# Patient Record
Sex: MALE | Race: OTHER | HISPANIC OR LATINO | URBAN - METROPOLITAN AREA
[De-identification: names, ages, dates, MRNs, and addresses within clinical notes are randomized per-mention and may not be internally consistent; named-entity substitution may affect disease eponyms.]

---

## 2023-04-21 ENCOUNTER — EMERGENCY (EMERGENCY)
Facility: HOSPITAL | Age: 43
LOS: 1 days | Discharge: HOME | End: 2023-04-21
Admitting: EMERGENCY MEDICINE
Payer: COMMERCIAL

## 2023-04-21 VITALS
WEIGHT: 160.06 LBS | DIASTOLIC BLOOD PRESSURE: 89 MMHG | SYSTOLIC BLOOD PRESSURE: 141 MMHG | TEMPERATURE: 98 F | HEART RATE: 91 BPM | OXYGEN SATURATION: 97 % | RESPIRATION RATE: 16 BRPM | HEIGHT: 65 IN

## 2023-04-21 VITALS
DIASTOLIC BLOOD PRESSURE: 84 MMHG | SYSTOLIC BLOOD PRESSURE: 127 MMHG | OXYGEN SATURATION: 100 % | HEART RATE: 74 BPM | RESPIRATION RATE: 16 BRPM | TEMPERATURE: 98 F

## 2023-04-21 DIAGNOSIS — M54.41 LUMBAGO WITH SCIATICA, RIGHT SIDE: ICD-10-CM

## 2023-04-21 DIAGNOSIS — M54.42 LUMBAGO WITH SCIATICA, LEFT SIDE: ICD-10-CM

## 2023-04-21 DIAGNOSIS — M54.50 LOW BACK PAIN, UNSPECIFIED: ICD-10-CM

## 2023-04-21 PROCEDURE — 99284 EMERGENCY DEPT VISIT MOD MDM: CPT | Mod: 25

## 2023-04-21 PROCEDURE — 99284 EMERGENCY DEPT VISIT MOD MDM: CPT

## 2023-04-21 PROCEDURE — 96372 THER/PROPH/DIAG INJ SC/IM: CPT

## 2023-04-21 RX ORDER — METHOCARBAMOL 500 MG/1
750 TABLET, FILM COATED ORAL ONCE
Refills: 0 | Status: COMPLETED | OUTPATIENT
Start: 2023-04-21 | End: 2023-04-21

## 2023-04-21 RX ORDER — KETOROLAC TROMETHAMINE 30 MG/ML
30 SYRINGE (ML) INJECTION ONCE
Refills: 0 | Status: DISCONTINUED | OUTPATIENT
Start: 2023-04-21 | End: 2023-04-21

## 2023-04-21 RX ORDER — LIDOCAINE 4 G/100G
1 CREAM TOPICAL ONCE
Refills: 0 | Status: COMPLETED | OUTPATIENT
Start: 2023-04-21 | End: 2023-04-21

## 2023-04-21 RX ORDER — OXYCODONE AND ACETAMINOPHEN 5; 325 MG/1; MG/1
1 TABLET ORAL ONCE
Refills: 0 | Status: DISCONTINUED | OUTPATIENT
Start: 2023-04-21 | End: 2023-04-21

## 2023-04-21 RX ORDER — IBUPROFEN 200 MG
1 TABLET ORAL
Qty: 30 | Refills: 0
Start: 2023-04-21

## 2023-04-21 RX ORDER — OXYCODONE AND ACETAMINOPHEN 5; 325 MG/1; MG/1
1 TABLET ORAL
Qty: 14 | Refills: 0
Start: 2023-04-21

## 2023-04-21 RX ORDER — METHOCARBAMOL 500 MG/1
1 TABLET, FILM COATED ORAL
Qty: 14 | Refills: 0
Start: 2023-04-21

## 2023-04-21 RX ADMIN — OXYCODONE AND ACETAMINOPHEN 1 TABLET(S): 5; 325 TABLET ORAL at 06:11

## 2023-04-21 RX ADMIN — Medication 30 MILLIGRAM(S): at 05:40

## 2023-04-21 RX ADMIN — LIDOCAINE 1 PATCH: 4 CREAM TOPICAL at 05:40

## 2023-04-21 RX ADMIN — METHOCARBAMOL 750 MILLIGRAM(S): 500 TABLET, FILM COATED ORAL at 05:57
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
## 2023-04-21 NOTE — ED PROVIDER NOTE - MUSCULOSKELETAL, MLM
Spine appears normal, no localized tenderness to sinal vertebrae, no step off sign, no rash, b/l paraspinal muscle tenderness in lumbar region

## 2023-04-21 NOTE — ED PROVIDER NOTE - NSFOLLOWUPINSTRUCTIONS_ED_ALL_ED_FT
Please take medications as prescribed and follow up with your PMD and or follow up with an ortho/spine doctor.        Sciatica    Sciatica is pain, numbness, weakness, or tingling along the path of the sciatic nerve. The sciatic nerve starts in the lower back and runs down the back of each leg. The nerve controls the muscles in the lower leg and in the back of the knee. It also provides feeling (sensation) to the back of the thigh, the lower leg, and the sole of the foot. Sciatica is a symptom of another medical condition that pinches or puts pressure on the sciatic nerve.    Sciatica most often only affects one side of the body. Sciatica usually goes away on its own or with treatment. In some cases, sciatica may come back (recur).    What are the causes?  This condition is caused by pressure on the sciatic nerve or pinching of the nerve. This may be the result of:  A disk in between the bones of the spine bulging out too far (herniated disk).  Age-related changes in the spinal disks.  A pain disorder that affects a muscle in the buttock.  Extra bone growth near the sciatic nerve.  A break (fracture) of the pelvis.  Pregnancy.  Tumor. This is rare.  What increases the risk?  The following factors may make you more likely to develop this condition:  Playing sports that place pressure or stress on the spine.  Having poor strength and flexibility.  A history of back injury or surgery.  Sitting for long periods of time.  Doing activities that involve repetitive bending or lifting.  Obesity.  What are the signs or symptoms?  Symptoms can vary from mild to very severe, and they may include:  Any of these problems in the lower back, leg, hip, or buttock:  Mild tingling, numbness, or dull aches.  Burning sensations.  Sharp pains.  Numbness in the back of the calf or the sole of the foot.  Leg weakness.  Severe back pain that makes movement difficult.  Symptoms may get worse when you cough, sneeze, or laugh, or when you sit or stand for long periods of time.    How is this diagnosed?  This condition may be diagnosed based on:  Your symptoms and medical history.  A physical exam.  Blood tests.  Imaging tests, such as:  X-rays.  MRI.  CT scan.  How is this treated?  In many cases, this condition improves on its own without treatment. However, treatment may include:  Reducing or modifying physical activity.  Exercising and stretching.  Icing and applying heat to the affected area.  Medicines that help to:  Relieve pain and swelling.  Relax your muscles.  Injections of medicines that help to relieve pain, irritation, and inflammation around the sciatic nerve (steroids).  Surgery.  Follow these instructions at home:  Medicines    Take over-the-counter and prescription medicines only as told by your health care provider.  Ask your health care provider if the medicine prescribed to you:  Requires you to avoid driving or using heavy machinery.  Can cause constipation. You may need to take these actions to prevent or treat constipation:  Drink enough fluid to keep your urine pale yellow.  Take over-the-counter or prescription medicines.  Eat foods that are high in fiber, such as beans, whole grains, and fresh fruits and vegetables.  Limit foods that are high in fat and processed sugars, such as fried or sweet foods.  Managing pain        If directed, put ice on the affected area.  Put ice in a plastic bag.  Place a towel between your skin and the bag.  Leave the ice on for 20 minutes, 2–3 times a day.  If directed, apply heat to the affected area. Use the heat source that your health care provider recommends, such as a moist heat pack or a heating pad.  Place a towel between your skin and the heat source.  Leave the heat on for 20–30 minutes.  Remove the heat if your skin turns bright red. This is especially important if you are unable to feel pain, heat, or cold. You may have a greater risk of getting burned.  Activity      Return to your normal activities as told by your health care provider. Ask your health care provider what activities are safe for you.  Avoid activities that make your symptoms worse.  Take brief periods of rest throughout the day.  When you rest for longer periods, mix in some mild activity or stretching between periods of rest. This will help to prevent stiffness and pain.  Avoid sitting for long periods of time without moving. Get up and move around at least one time each hour.  Exercise and stretch regularly, as told by your health care provider.  Do not lift anything that is heavier than 10 lb (4.5 kg) while you have symptoms of sciatica. When you do not have symptoms, you should still avoid heavy lifting, especially repetitive heavy lifting.  When you lift objects, always use proper lifting technique, which includes:  Bending your knees.  Keeping the load close to your body.  Avoiding twisting.  General instructions    Maintain a healthy weight. Excess weight puts extra stress on your back.  Wear supportive, comfortable shoes. Avoid wearing high heels.  Avoid sleeping on a mattress that is too soft or too hard. A mattress that is firm enough to support your back when you sleep may help to reduce your pain.  Keep all follow-up visits as told by your health care provider. This is important.  Contact a health care provider if:  You have pain that:  Wakes you up when you are sleeping.  Gets worse when you lie down.  Is worse than you have experienced in the past.  Lasts longer than 4 weeks.  You have an unexplained weight loss.  Get help right away if:  You are not able to control when you urinate or have bowel movements (incontinence).  You have:  Weakness in your lower back, pelvis, buttocks, or legs that gets worse.  Redness or swelling of your back.  A burning sensation when you urinate.  Summary  Sciatica is pain, numbness, weakness, or tingling along the path of the sciatic nerve.  This condition is caused by pressure on the sciatic nerve or pinching of the nerve.  Sciatica can cause pain, numbness, or tingling in the lower back, legs, hips, and buttocks.  Treatment often includes rest, exercise, medicines, and applying ice or heat.  This information is not intended to replace advice given to you by your health care provider. Make sure you discuss any questions you have with your health care provider.

## 2023-04-21 NOTE — ED ADULT TRIAGE NOTE - ARRIVAL INFO ADDITIONAL COMMENTS
Denies injury, fall, trauma to site. States taking Ibuprofen at 2200 last night without relief from pain.
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
## 2023-04-21 NOTE — ED ADULT TRIAGE NOTE - BMI (KG/M2)
26.6 Hydroxyzine Pregnancy And Lactation Text: This medication is not safe during pregnancy and should not be taken. It is also excreted in breast milk and breast feeding isn't recommended.

## 2023-04-21 NOTE — ED PROVIDER NOTE - PATIENT PORTAL LINK FT
You can access the FollowMyHealth Patient Portal offered by Health system by registering at the following website: [email or web address]. By joining "[employer]’s FollowMyHealth portal, you will also be able to view your health information using other applications (apps) compatible with our system.